# Patient Record
Sex: MALE | Race: WHITE
[De-identification: names, ages, dates, MRNs, and addresses within clinical notes are randomized per-mention and may not be internally consistent; named-entity substitution may affect disease eponyms.]

---

## 2018-01-29 ENCOUNTER — HOSPITAL ENCOUNTER (OUTPATIENT)
Dept: HOSPITAL 17 - HROP | Age: 48
Discharge: HOME | End: 2018-01-29
Attending: SURGERY
Payer: COMMERCIAL

## 2018-01-29 VITALS
OXYGEN SATURATION: 96 % | HEART RATE: 77 BPM | SYSTOLIC BLOOD PRESSURE: 118 MMHG | RESPIRATION RATE: 18 BRPM | DIASTOLIC BLOOD PRESSURE: 79 MMHG

## 2018-01-29 VITALS
TEMPERATURE: 97.4 F | SYSTOLIC BLOOD PRESSURE: 107 MMHG | DIASTOLIC BLOOD PRESSURE: 62 MMHG | RESPIRATION RATE: 16 BRPM | HEART RATE: 107 BPM | OXYGEN SATURATION: 97 %

## 2018-01-29 VITALS
TEMPERATURE: 98.4 F | RESPIRATION RATE: 20 BRPM | SYSTOLIC BLOOD PRESSURE: 126 MMHG | DIASTOLIC BLOOD PRESSURE: 81 MMHG | HEART RATE: 86 BPM | OXYGEN SATURATION: 95 %

## 2018-01-29 VITALS
OXYGEN SATURATION: 98 % | DIASTOLIC BLOOD PRESSURE: 55 MMHG | HEART RATE: 67 BPM | RESPIRATION RATE: 18 BRPM | SYSTOLIC BLOOD PRESSURE: 92 MMHG

## 2018-01-29 VITALS
HEART RATE: 54 BPM | SYSTOLIC BLOOD PRESSURE: 105 MMHG | RESPIRATION RATE: 20 BRPM | OXYGEN SATURATION: 99 % | DIASTOLIC BLOOD PRESSURE: 63 MMHG

## 2018-01-29 VITALS — BODY MASS INDEX: 26.7 KG/M2 | WEIGHT: 160.28 LBS | HEIGHT: 65 IN

## 2018-01-29 DIAGNOSIS — M41.9: ICD-10-CM

## 2018-01-29 DIAGNOSIS — I10: ICD-10-CM

## 2018-01-29 DIAGNOSIS — B19.20: ICD-10-CM

## 2018-01-29 DIAGNOSIS — K21.9: ICD-10-CM

## 2018-01-29 DIAGNOSIS — I82.401: Primary | ICD-10-CM

## 2018-01-29 LAB
BASOPHILS # BLD AUTO: 0.1 TH/MM3 (ref 0–0.2)
BASOPHILS NFR BLD: 0.7 % (ref 0–2)
BUN SERPL-MCNC: 14 MG/DL (ref 7–18)
CALCIUM SERPL-MCNC: 9.1 MG/DL (ref 8.5–10.1)
CHLORIDE SERPL-SCNC: 104 MEQ/L (ref 98–107)
CREAT SERPL-MCNC: 0.51 MG/DL (ref 0.6–1.3)
EOSINOPHIL # BLD: 0.1 TH/MM3 (ref 0–0.4)
EOSINOPHIL NFR BLD: 1.9 % (ref 0–4)
ERYTHROCYTE [DISTWIDTH] IN BLOOD BY AUTOMATED COUNT: 14.6 % (ref 11.6–17.2)
GFR SERPLBLD BASED ON 1.73 SQ M-ARVRAT: 176 ML/MIN (ref 89–?)
GLUCOSE SERPL-MCNC: 81 MG/DL (ref 74–106)
HCO3 BLD-SCNC: 29.3 MEQ/L (ref 21–32)
HCT VFR BLD CALC: 34.6 % (ref 39–51)
HGB BLD-MCNC: 11.6 GM/DL (ref 13–17)
INR PPP: 1.1 RATIO
LYMPHOCYTES # BLD AUTO: 2 TH/MM3 (ref 1–4.8)
LYMPHOCYTES NFR BLD AUTO: 25.2 % (ref 9–44)
MCH RBC QN AUTO: 28.2 PG (ref 27–34)
MCHC RBC AUTO-ENTMCNC: 33.6 % (ref 32–36)
MCV RBC AUTO: 83.9 FL (ref 80–100)
MONOCYTE #: 0.8 TH/MM3 (ref 0–0.9)
MONOCYTES NFR BLD: 10.3 % (ref 0–8)
NEUTROPHILS # BLD AUTO: 4.8 TH/MM3 (ref 1.8–7.7)
NEUTROPHILS NFR BLD AUTO: 61.9 % (ref 16–70)
PLATELET # BLD: 399 TH/MM3 (ref 150–450)
PMV BLD AUTO: 7 FL (ref 7–11)
PROTHROMBIN TIME: 11.5 SEC (ref 9.8–11.6)
RBC # BLD AUTO: 4.12 MIL/MM3 (ref 4.5–5.9)
SODIUM SERPL-SCNC: 138 MEQ/L (ref 136–145)
WBC # BLD AUTO: 7.7 TH/MM3 (ref 4–11)

## 2018-01-29 PROCEDURE — 80048 BASIC METABOLIC PNL TOTAL CA: CPT

## 2018-01-29 PROCEDURE — 36005 INJECTION EXT VENOGRAPHY: CPT

## 2018-01-29 PROCEDURE — 85025 COMPLETE CBC W/AUTO DIFF WBC: CPT

## 2018-01-29 PROCEDURE — C1887 CATHETER, GUIDING: HCPCS

## 2018-01-29 PROCEDURE — 76937 US GUIDE VASCULAR ACCESS: CPT

## 2018-01-29 PROCEDURE — 85610 PROTHROMBIN TIME: CPT

## 2018-01-29 PROCEDURE — 85730 THROMBOPLASTIN TIME PARTIAL: CPT

## 2018-01-29 PROCEDURE — 75820 VEIN X-RAY ARM/LEG: CPT

## 2018-01-29 PROCEDURE — C1769 GUIDE WIRE: HCPCS

## 2018-01-29 PROCEDURE — 99153 MOD SED SAME PHYS/QHP EA: CPT

## 2018-01-29 PROCEDURE — C1894 INTRO/SHEATH, NON-LASER: HCPCS

## 2018-01-29 PROCEDURE — 99152 MOD SED SAME PHYS/QHP 5/>YRS: CPT

## 2018-01-29 NOTE — RADRPT
EXAM DATE/TIME:  01/29/2018 11:09 

 

[facility] COMPARISON:  

No previous studies available for comparison.

                     

 

INDICATIONS :      

Patient with a history of DVT in 2011. Patient has right lower extremity swelling and nonhealing woun
d involving the right lower extremity. Venogram and possible intervention requested.

                     

 

MEDICAL HISTORY :     

Hep C

HTN

GERD

Scoliosis

 

SURGICAL HISTORY :     

Spine surgery

Hip surgery

 

ENCOUNTER:     

Initial

 

ACUITY:     

>1 year

 

PAIN SCORE:     

0/10

                     

                     

 

FLUORO TIME:     

3.4 minutes

 

IMAGE SERIES:      

5

 

ACCESS SITE:     

Right Popliteal vein 

 

CONTRAST:      

30 cc Visipaque (iodixanol)

 

 

MEDICATION(S):     

1.)  2.5 mg midazolam (Versed)  IV     

2.)  100 mcg fentanyl (Sublimaze)  IV     

      

 

PROCEDURE :     

1. Ultrasound-guided venipuncture.

2. Venogram.

 

The risks, benefits and alternatives to the procedure were explained and verbal and written consent w
as obtained.  The site was prepped in sterile fashion.  Full sterile technique was used, including ca
p, mask, sterile gloves and gown and a large sterile sheet.  Hand hygiene and 2% chlorhexidine and/or
 betadine/alcohol prep was utilized per protocol for cutaneous antisepsis.  Sterile gel and sterile p
robe cover were utilized for ultrasound guidance.  The skin and subcutaneous tissues were infiltrated
 with local anesthetic solution. 

 

With ultrasound guidance a collateral vein that drains into the popliteal vein was punctured and posi
tive contrast was injected to demonstrate the venous anatomy of the right lower extremity. A 6 Macedonian
 sheath was placed. A Berenstein catheter was positioned more cephalad within the lower extremity for
 better opacification of the more proximal venous structures. Passage of the catheter and wire clearl
y show chronic well-formed mature thrombus throughout. The diagnostic venogram shows chronic disease 
throughout the right thigh. Large collaterals are seen about the knee joint. There is chronic occlusi
on of the popliteal vein at the level the knee joint. The above the knee extent is patent with chroni
c occlusion of the superficial femoral vein throughout the majority of the thigh. There are scattered
 areas of chronic thrombus within the short segment reconstituted segments of the superficial femoral
 vein. The common femoral vein is occluded with a mixture of acute and chronic thrombus. No named ves
rafaela is seen cephalad to the common femoral vein. Multiple anterior bowel wall and retroperitoneal silas
icosities are observed. 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 A filter is seen in this area.

 

 

CONCLUSION:     

Venography shows chronic disease involving the right lower extremity and pelvis as detailed above. Th
ere is no named venous structure within the pelvis that is patent and the inferior portion of the IVC
 is also chronically occluded. This patient would not benefit from attempts at thrombolysis. I spoke 
with Dr. Osei.

 

 

 

 Steve Robles Jr., MD on January 29, 2018 at 11:19           

Board Certified Radiologist.

 This report was verified electronically.

## 2018-01-29 NOTE — PD.RAD
Post Procedure Progress Note


Pre Procedure Diagnosis:  


(1) Right leg swelling


Post Procedure Diagnosis:  


(1) Right leg swelling


Procedure Date:


Jan 29, 2018


Supervising Radiologist:


Steve Robles JR


Proceduralist/Assist:  Angélica Vyas, RT(R), Carmen Reno RT(R)(VI)


Anesthesia:  Conscious Sedation


Plan of Activity


Patient to Unit:  [facility]


Patient Condition:  Good


Additional Comments:


Right lower extremity venogram shows severe chronic disease. There is chronic 

occlusion of the majority of the SVF, potential subacute thrombus that is 

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 

common femoral v, and lower IVC. Patient would not benefit from attempts at 

thrombolysis. I spoke to Dr Osei.


See PACS Report for procedural detail/treatment











Jr. Travis,Steve Longo MD Jan 29, 2018 10:57